# Patient Record
Sex: FEMALE | URBAN - METROPOLITAN AREA
[De-identification: names, ages, dates, MRNs, and addresses within clinical notes are randomized per-mention and may not be internally consistent; named-entity substitution may affect disease eponyms.]

---

## 2018-03-22 ENCOUNTER — APPOINTMENT (RX ONLY)
Dept: URBAN - METROPOLITAN AREA CLINIC 53 | Facility: CLINIC | Age: 73
Setting detail: DERMATOLOGY
End: 2018-03-22

## 2018-03-22 DIAGNOSIS — Z41.9 ENCOUNTER FOR PROCEDURE FOR PURPOSES OTHER THAN REMEDYING HEALTH STATE, UNSPECIFIED: ICD-10-CM

## 2018-03-22 PROCEDURE — ? BOTOX (U OR CC)

## 2018-03-22 ASSESSMENT — LOCATION DETAILED DESCRIPTION DERM
LOCATION DETAILED: RIGHT INFERIOR FOREHEAD
LOCATION DETAILED: LEFT INFERIOR FOREHEAD
LOCATION DETAILED: LEFT INFERIOR TEMPLE
LOCATION DETAILED: RIGHT LOWER CUTANEOUS LIP
LOCATION DETAILED: RIGHT INFERIOR MEDIAL FOREHEAD
LOCATION DETAILED: LEFT CENTRAL MALAR CHEEK
LOCATION DETAILED: LEFT UPPER CUTANEOUS LIP
LOCATION DETAILED: LEFT INFERIOR LATERAL FOREHEAD
LOCATION DETAILED: GLABELLA
LOCATION DETAILED: RIGHT INFERIOR TEMPLE
LOCATION DETAILED: RIGHT UPPER CUTANEOUS LIP
LOCATION DETAILED: PHILTRUM
LOCATION DETAILED: LEFT LOWER CUTANEOUS LIP
LOCATION DETAILED: RIGHT CENTRAL MALAR CHEEK

## 2018-03-22 ASSESSMENT — LOCATION SIMPLE DESCRIPTION DERM
LOCATION SIMPLE: RIGHT TEMPLE
LOCATION SIMPLE: LEFT LIP
LOCATION SIMPLE: RIGHT CHEEK
LOCATION SIMPLE: RIGHT FOREHEAD
LOCATION SIMPLE: LEFT FOREHEAD
LOCATION SIMPLE: LEFT TEMPLE
LOCATION SIMPLE: UPPER LIP
LOCATION SIMPLE: GLABELLA
LOCATION SIMPLE: RIGHT LIP
LOCATION SIMPLE: LEFT CHEEK

## 2018-03-22 ASSESSMENT — LOCATION ZONE DERM
LOCATION ZONE: FACE
LOCATION ZONE: LIP

## 2018-03-22 NOTE — PROCEDURE: BOTOX (U OR CC)
Post-Care Instructions: Instructions after Botox:\\nDo not lay flat for 4 hours after procedure. Limit physical activity for 24 hours. No heavy lifting (15lbs or greater) or bending (putting head below level of heart) for the remainder of the day. Do not apply pressure or compression to areas treated for 24 hours. Avoid facials or saunas for 24 hours after your treatment. Do not apply heat for 24 hours. Avoid traveling by airplane for 48 hours. Try to avoid make-up and skincare products for the remainder of the day to minimize risk for infection. If you need to apply make-up within 4 hours after your treatment, only use a GENTLE touch to avoid rubbing the treated area.\\n\\nFeel free to shower and go about most other regular daily activities. Be assured that any tiny bumps or marks will go away within a few hours.  Results may take up to 14 days to take full effect. Please wait until the 14 days have passed before assessing if you are pleased with the result. \\n\\nBotox is a temporary procedure. You may find that your treatment results will last approximately 3-4 months. If you maintain your treatment appointments with routine frequency, the duration of each treatment may last longer than 4 months. With routine follow-up treatments, we are able to create the best clinical results for you.
Consent: Written consent obtained. Risks include but not limited to lid/brow ptosis, bruising, swelling, diplopia, temporary effect, incomplete chemical denervation.
Detail Level: Detailed
Dilution (U/0.1 Cc): 1.1
Price Per Unit Or Per Cc In $ (Use Numbers Only, No Special Characters Or $): 12.00
Measure In Units Or Cc's?: units
Quantity Per Injection Site (Units): 7
Quantity Per Injection Site (Units): 5
Quantity Per Injection Site (Units): 1
Quantity Per Injection Site (Units): 12
Quantity Per Injection Site (Units): 2

## 2018-06-14 ENCOUNTER — APPOINTMENT (RX ONLY)
Dept: URBAN - METROPOLITAN AREA CLINIC 53 | Facility: CLINIC | Age: 73
Setting detail: DERMATOLOGY
End: 2018-06-14

## 2018-06-14 DIAGNOSIS — Z41.9 ENCOUNTER FOR PROCEDURE FOR PURPOSES OTHER THAN REMEDYING HEALTH STATE, UNSPECIFIED: ICD-10-CM

## 2018-06-14 PROCEDURE — ? FILLERS

## 2018-06-14 PROCEDURE — ? BOTOX (U OR CC)

## 2018-06-14 ASSESSMENT — LOCATION DETAILED DESCRIPTION DERM
LOCATION DETAILED: RIGHT INFERIOR FOREHEAD
LOCATION DETAILED: LEFT UPPER CUTANEOUS LIP
LOCATION DETAILED: RIGHT UPPER CUTANEOUS LIP
LOCATION DETAILED: PHILTRUM
LOCATION DETAILED: LEFT INFERIOR FOREHEAD
LOCATION DETAILED: RIGHT INFERIOR VERMILION LIP
LOCATION DETAILED: LEFT INFERIOR MEDIAL FOREHEAD
LOCATION DETAILED: RIGHT INFERIOR MEDIAL FOREHEAD
LOCATION DETAILED: LEFT INFERIOR VERMILION BORDER
LOCATION DETAILED: RIGHT SUPERIOR CENTRAL MALAR CHEEK
LOCATION DETAILED: LEFT SUPERIOR CENTRAL MALAR CHEEK
LOCATION DETAILED: GLABELLA
LOCATION DETAILED: LEFT INFERIOR TEMPLE
LOCATION DETAILED: RIGHT INFERIOR TEMPLE

## 2018-06-14 ASSESSMENT — LOCATION SIMPLE DESCRIPTION DERM
LOCATION SIMPLE: LEFT FOREHEAD
LOCATION SIMPLE: UPPER LIP
LOCATION SIMPLE: RIGHT FOREHEAD
LOCATION SIMPLE: RIGHT LIP
LOCATION SIMPLE: LEFT LIP
LOCATION SIMPLE: LEFT LOWER LIP
LOCATION SIMPLE: GLABELLA
LOCATION SIMPLE: LEFT CHEEK
LOCATION SIMPLE: RIGHT TEMPLE
LOCATION SIMPLE: RIGHT CHEEK
LOCATION SIMPLE: LEFT TEMPLE

## 2018-06-14 ASSESSMENT — LOCATION ZONE DERM
LOCATION ZONE: FACE
LOCATION ZONE: LIP

## 2018-06-14 NOTE — PROCEDURE: FILLERS
Price (Use Numbers Only, No Special Characters Or $): 700 Price (Use Numbers Only, No Special Characters Or $): 175

## 2018-06-14 NOTE — PROCEDURE: BOTOX (U OR CC)
Consent: Written consent obtained. Risks include but not limited to lid/brow ptosis, bruising, swelling, diplopia, temporary effect, incomplete chemical denervation.
Dilution (U/0.1 Cc): 1.1
Post-Care Instructions: Instructions after Botox:\\nDo not lay flat for 4 hours after procedure. Limit physical activity for 24 hours. No heavy lifting (15lbs or greater) or bending (putting head below level of heart) for the remainder of the day. Do not apply pressure or compression to areas treated for 24 hours. Avoid facials or saunas for 24 hours after your treatment. Do not apply heat for 24 hours. Avoid traveling by airplane for 48 hours. Try to avoid make-up and skincare products for the remainder of the day to minimize risk for infection. If you need to apply make-up within 4 hours after your treatment, only use a GENTLE touch to avoid rubbing the treated area.\\n\\nFeel free to shower and go about most other regular daily activities. Be assured that any tiny bumps or marks will go away within a few hours.  Results may take up to 14 days to take full effect. Please wait until the 14 days have passed before assessing if you are pleased with the result. \\n\\nBotox is a temporary procedure. You may find that your treatment results will last approximately 3-4 months. If you maintain your treatment appointments with routine frequency, the duration of each treatment may last longer than 4 months. With routine follow-up treatments, we are able to create the best clinical results for you.
Measure In Units Or Cc's?: units
Detail Level: Detailed
Price Per Unit Or Per Cc In $ (Use Numbers Only, No Special Characters Or $): 12.00
Quantity Per Injection Site (Units): 7
Quantity Per Injection Site (Units): 6
Quantity Per Injection Site (Units): 5
Quantity Per Injection Site (Units): 12
Quantity Per Injection Site (Units): 1
Quantity Per Injection Site (Units): 2

## 2018-12-12 ENCOUNTER — APPOINTMENT (RX ONLY)
Dept: URBAN - METROPOLITAN AREA CLINIC 53 | Facility: CLINIC | Age: 73
Setting detail: DERMATOLOGY
End: 2018-12-12

## 2018-12-12 DIAGNOSIS — Z41.9 ENCOUNTER FOR PROCEDURE FOR PURPOSES OTHER THAN REMEDYING HEALTH STATE, UNSPECIFIED: ICD-10-CM

## 2018-12-12 PROCEDURE — ? BOTOX (U OR CC)

## 2018-12-12 ASSESSMENT — LOCATION DETAILED DESCRIPTION DERM
LOCATION DETAILED: GLABELLA
LOCATION DETAILED: RIGHT INFERIOR FOREHEAD
LOCATION DETAILED: LEFT INFERIOR TEMPLE
LOCATION DETAILED: LEFT INFERIOR FOREHEAD
LOCATION DETAILED: LEFT INFERIOR MEDIAL FOREHEAD
LOCATION DETAILED: RIGHT INFERIOR MEDIAL FOREHEAD
LOCATION DETAILED: LEFT SUPERIOR CENTRAL MALAR CHEEK
LOCATION DETAILED: RIGHT INFERIOR TEMPLE
LOCATION DETAILED: RIGHT SUPERIOR CENTRAL MALAR CHEEK

## 2018-12-12 ASSESSMENT — LOCATION SIMPLE DESCRIPTION DERM
LOCATION SIMPLE: RIGHT CHEEK
LOCATION SIMPLE: LEFT TEMPLE
LOCATION SIMPLE: LEFT FOREHEAD
LOCATION SIMPLE: GLABELLA
LOCATION SIMPLE: RIGHT TEMPLE
LOCATION SIMPLE: LEFT CHEEK
LOCATION SIMPLE: RIGHT FOREHEAD

## 2018-12-12 ASSESSMENT — LOCATION ZONE DERM: LOCATION ZONE: FACE

## 2018-12-12 NOTE — PROCEDURE: BOTOX (U OR CC)
Dilution (U/0.1 Cc): 1.1
Measure In Units Or Cc's?: units
Consent: Written consent obtained. Risks include but not limited to lid/brow ptosis, bruising, swelling, diplopia, temporary effect, incomplete chemical denervation.
Price Per Unit Or Per Cc In $ (Use Numbers Only, No Special Characters Or $): 12.00
Detail Level: Detailed
Post-Care Instructions: Instructions after Botox:\\nDo not lay flat for 4 hours after procedure. Limit physical activity for 24 hours. No heavy lifting (15lbs or greater) or bending (putting head below level of heart) for the remainder of the day. Do not apply pressure or compression to areas treated for 24 hours. Avoid facials or saunas for 24 hours after your treatment. Do not apply heat for 24 hours. Avoid traveling by airplane for 48 hours. Try to avoid make-up and skincare products for the remainder of the day to minimize risk for infection. If you need to apply make-up within 4 hours after your treatment, only use a GENTLE touch to avoid rubbing the treated area.\\n\\nFeel free to shower and go about most other regular daily activities. Be assured that any tiny bumps or marks will go away within a few hours.  Results may take up to 14 days to take full effect. Please wait until the 14 days have passed before assessing if you are pleased with the result. \\n\\nBotox is a temporary procedure. You may find that your treatment results will last approximately 3-4 months. If you maintain your treatment appointments with routine frequency, the duration of each treatment may last longer than 4 months. With routine follow-up treatments, we are able to create the best clinical results for you.
Quantity Per Injection Site (Units): 7
Quantity Per Injection Site (Units): 6
Quantity Per Injection Site (Units): 5
Quantity Per Injection Site (Units): 12
Quantity Per Injection Site (Units): 1

## 2019-12-18 ENCOUNTER — APPOINTMENT (RX ONLY)
Dept: URBAN - METROPOLITAN AREA CLINIC 53 | Facility: CLINIC | Age: 74
Setting detail: DERMATOLOGY
End: 2019-12-18

## 2019-12-18 DIAGNOSIS — Z41.9 ENCOUNTER FOR PROCEDURE FOR PURPOSES OTHER THAN REMEDYING HEALTH STATE, UNSPECIFIED: ICD-10-CM

## 2019-12-18 PROCEDURE — ? DIAGNOSIS COMMENT

## 2019-12-18 PROCEDURE — ? JUVEDERM VOLUMA XC INJECTION

## 2019-12-18 PROCEDURE — ? JUVEDERM ULTRA PLUS XC INJECTION

## 2019-12-18 PROCEDURE — ? BOTOX (U OR CC)

## 2019-12-18 ASSESSMENT — LOCATION ZONE DERM
LOCATION ZONE: FACE
LOCATION ZONE: LIP

## 2019-12-18 ASSESSMENT — LOCATION SIMPLE DESCRIPTION DERM
LOCATION SIMPLE: LEFT CHEEK
LOCATION SIMPLE: RIGHT CHEEK
LOCATION SIMPLE: LEFT TEMPLE
LOCATION SIMPLE: RIGHT TEMPLE
LOCATION SIMPLE: LEFT LIP
LOCATION SIMPLE: GLABELLA

## 2019-12-18 ASSESSMENT — LOCATION DETAILED DESCRIPTION DERM
LOCATION DETAILED: GLABELLA
LOCATION DETAILED: LEFT CENTRAL MALAR CHEEK
LOCATION DETAILED: RIGHT INFERIOR TEMPLE
LOCATION DETAILED: LEFT INFERIOR TEMPLE
LOCATION DETAILED: RIGHT CENTRAL MALAR CHEEK
LOCATION DETAILED: LEFT INFERIOR VERMILION LIP

## 2019-12-18 NOTE — PROCEDURE: JUVEDERM ULTRA PLUS XC INJECTION
Price (Use Numbers Only, No Special Characters Or $): 692 Price (Use Numbers Only, No Special Characters Or $): 986

## 2019-12-18 NOTE — PROCEDURE: JUVEDERM VOLUMA XC INJECTION
Price (Use Numbers Only, No Special Characters Or $): 246 Price (Use Numbers Only, No Special Characters Or $): 656

## 2020-06-11 ENCOUNTER — APPOINTMENT (RX ONLY)
Dept: URBAN - METROPOLITAN AREA CLINIC 53 | Facility: CLINIC | Age: 75
Setting detail: DERMATOLOGY
End: 2020-06-11

## 2020-06-11 DIAGNOSIS — Z41.9 ENCOUNTER FOR PROCEDURE FOR PURPOSES OTHER THAN REMEDYING HEALTH STATE, UNSPECIFIED: ICD-10-CM

## 2020-06-11 PROCEDURE — ? BOTOX (U OR CC)

## 2020-06-11 ASSESSMENT — LOCATION SIMPLE DESCRIPTION DERM
LOCATION SIMPLE: LEFT TEMPLE
LOCATION SIMPLE: RIGHT CHEEK
LOCATION SIMPLE: RIGHT TEMPLE
LOCATION SIMPLE: GLABELLA
LOCATION SIMPLE: LEFT CHEEK

## 2020-06-11 ASSESSMENT — LOCATION DETAILED DESCRIPTION DERM
LOCATION DETAILED: RIGHT MID TEMPLE
LOCATION DETAILED: GLABELLA
LOCATION DETAILED: LEFT SUPERIOR CENTRAL MALAR CHEEK
LOCATION DETAILED: LEFT INFERIOR TEMPLE
LOCATION DETAILED: RIGHT SUPERIOR MEDIAL MALAR CHEEK

## 2020-06-11 ASSESSMENT — LOCATION ZONE DERM: LOCATION ZONE: FACE

## 2021-04-15 ENCOUNTER — APPOINTMENT (RX ONLY)
Dept: URBAN - METROPOLITAN AREA CLINIC 53 | Facility: CLINIC | Age: 76
Setting detail: DERMATOLOGY
End: 2021-04-15

## 2021-04-15 VITALS — TEMPERATURE: 97 F

## 2021-04-15 DIAGNOSIS — Z41.9 ENCOUNTER FOR PROCEDURE FOR PURPOSES OTHER THAN REMEDYING HEALTH STATE, UNSPECIFIED: ICD-10-CM

## 2021-04-15 PROCEDURE — ? COSMETIC CONSULTATION: FILLERS

## 2021-04-15 PROCEDURE — ? DIAGNOSIS COMMENT

## 2021-04-15 PROCEDURE — ? BOTOX (U OR CC)

## 2021-04-15 ASSESSMENT — LOCATION DETAILED DESCRIPTION DERM
LOCATION DETAILED: LEFT INFERIOR VERMILION LIP
LOCATION DETAILED: RIGHT SUPERIOR MEDIAL MALAR CHEEK
LOCATION DETAILED: GLABELLA
LOCATION DETAILED: RIGHT MID TEMPLE
LOCATION DETAILED: LEFT INFERIOR TEMPLE
LOCATION DETAILED: LEFT SUPERIOR CENTRAL MALAR CHEEK
LOCATION DETAILED: LEFT SUPERIOR VERMILION LIP
LOCATION DETAILED: RIGHT CENTRAL BUCCAL CHEEK
LOCATION DETAILED: LEFT CHIN
LOCATION DETAILED: LEFT CENTRAL BUCCAL CHEEK

## 2021-04-15 ASSESSMENT — LOCATION SIMPLE DESCRIPTION DERM
LOCATION SIMPLE: LEFT LIP
LOCATION SIMPLE: RIGHT CHEEK
LOCATION SIMPLE: GLABELLA
LOCATION SIMPLE: CHIN
LOCATION SIMPLE: RIGHT TEMPLE
LOCATION SIMPLE: LEFT CHEEK
LOCATION SIMPLE: LEFT TEMPLE

## 2021-04-15 ASSESSMENT — LOCATION ZONE DERM
LOCATION ZONE: FACE
LOCATION ZONE: LIP

## 2021-04-15 NOTE — PROCEDURE: BOTOX (U OR CC)
Consent: Written consent obtained. Risks include but not limited to lid/brow ptosis, bruising, swelling, diplopia, temporary effect, incomplete chemical denervation. Full range of motion of upper and lower extremities, no joint tenderness/swelling.

## 2021-05-06 ENCOUNTER — APPOINTMENT (RX ONLY)
Dept: URBAN - METROPOLITAN AREA CLINIC 53 | Facility: CLINIC | Age: 76
Setting detail: DERMATOLOGY
End: 2021-05-06

## 2021-05-06 DIAGNOSIS — Z41.9 ENCOUNTER FOR PROCEDURE FOR PURPOSES OTHER THAN REMEDYING HEALTH STATE, UNSPECIFIED: ICD-10-CM

## 2021-05-06 PROCEDURE — ? JUVEDERM ULTRA PLUS XC INJECTION

## 2021-05-06 PROCEDURE — ? BOTOX (U OR CC)

## 2021-05-06 PROCEDURE — ? ADDITIONAL NOTES

## 2021-05-06 PROCEDURE — ? JUVEDERM VOLUMA XC INJECTION

## 2021-05-06 ASSESSMENT — LOCATION DETAILED DESCRIPTION DERM
LOCATION DETAILED: LEFT INFERIOR FOREHEAD
LOCATION DETAILED: INFERIOR MID FOREHEAD

## 2021-05-06 ASSESSMENT — LOCATION ZONE DERM: LOCATION ZONE: FACE

## 2021-05-06 ASSESSMENT — LOCATION SIMPLE DESCRIPTION DERM
LOCATION SIMPLE: LEFT FOREHEAD
LOCATION SIMPLE: INFERIOR FOREHEAD

## 2021-05-06 NOTE — PROCEDURE: BOTOX (U OR CC)
Consent: Written consent obtained. Risks include but not limited to lid/brow ptosis, bruising, swelling, diplopia, temporary effect, incomplete chemical denervation.
Price Per Unit Or Per Cc In $ (Use Numbers Only, No Special Characters Or $): 12.00
Additional Area 1 Location: mouth
Post-Care Instructions: Instructions after Botox:\\nDo not lay flat for 4 hours after procedure. Limit physical activity for 24 hours. No heavy lifting (15lbs or greater) or bending (putting head below level of heart) for the remainder of the day. Do not apply pressure or compression to areas treated for 24 hours. Avoid facials or saunas for 24 hours after your treatment. Do not apply heat for 24 hours. Avoid traveling by airplane for 48 hours. Try to avoid make-up and skincare products for the remainder of the day to minimize risk for infection. If you need to apply make-up within 4 hours after your treatment, only use a GENTLE touch to avoid rubbing the treated area.\\n\\nFeel free to shower and go about most other regular daily activities. Be assured that any tiny bumps or marks will go away within a few hours.  Results may take up to 14 days to take full effect. Please wait until the 14 days have passed before assessing if you are pleased with the result. \\n\\nBotox is a temporary procedure. You may find that your treatment results will last approximately 3-4 months. If you maintain your treatment appointments with routine frequency, the duration of each treatment may last longer than 4 months. With routine follow-up treatments, we are able to create the best clinical results for you.
Dilution (U/0.1 Cc): 1.1
Detail Level: Detailed
Measure In Units Or Cc's?: units
Quantity Per Injection Site (Units): 3
Quantity Per Injection Site (Units): 2

## 2021-05-06 NOTE — PROCEDURE: JUVEDERM ULTRA PLUS XC INJECTION
Price (Use Numbers Only, No Special Characters Or $): 928 Price (Use Numbers Only, No Special Characters Or $): 281